# Patient Record
(demographics unavailable — no encounter records)

---

## 2024-12-31 NOTE — HISTORY OF PRESENT ILLNESS
[FreeTextEntry1] : This is a 67 year old male diagnosed with prostate cancer in June 2023 referred by Dr Ricardo Mcdowell and Dr. Eagle.   He presented with an elevated PSA of 7.7 ng/mL drawn on 4/24/23.   MRI of the prostate performed on 5/22/23 showed a 51.4 cc prostate. A 44 x 31 mm lesion predominantly right (crossing into left) throughout transverse plane midgland (extending from base to apex) peripheral zone lesion extending into the transition zone. PIRADS 5. Overlying gross extraprostatic extension. No seminal vesicle invasion. A 5 mm left pelvic sidewall lymph nodes, possibly reactive.   Repeat PSA on 5/31/23 was 6.7 ng/mL.   Prostate biopsy performed on 6/22/23 showed adenocarcinoma in 9 of 13 cores. Christian score of 3+4=7 in 9 cores. Maximum core involvement 95%.   He states he has nocturia 1x.    He was assessed to have prostate adenoca, at least stage IIIB, [id number], PSA of 7.7, Christian score of 7 (3+4), with 9 of 13 cores positive, max core involvement 95%. MRI prostate shows prostate enlargement and a broad zone of extracapsular extension of disease. Patient has high-risk high-volume disease. PSMA PET/CT scan did not show any pelvic ernesto disease or osseous metastases.  He completed RT to the prostate, SV, Nodes, total dose of 7020 cGy in 26 fx, on 11/29/23.   PSA drawn by Dr. Mcdowell on 12/8/23 was 0.18. PSA on 5/16/2024 was 0.03 ng/mL  Receives Lupron injections q3 months with Dr. Mcdowell.    7/08/2024 Patient presents for routine 8 month follow up. Feeling well. Denies urinary complaints. Denies any bowel issues.   01/09/2025 Patient presents for a 1 year 1 month follow up visit since completing radiation therapy to the prostate/sv/nodes from 10/24/23 to 11/29/23.  Patient denies dysuria, urinary frequency, urgency, hematuria, incontinence. Nocturia x  Patient continues to follow with Dr Ricardo Mcdowell and Dr. Eagle. He receives Lupron injections every 3 months with Dr. Mcdowell? Last PSA...awaiting records from Dr Ricardo Mcdowell and Dr. Eagle office.

## 2025-01-17 NOTE — HISTORY OF PRESENT ILLNESS
[FreeTextEntry1] : This is a 67 year old male diagnosed with prostate cancer in June 2023 referred by Dr Ricardo Mcdowell and Dr. Eagle.   He presented with an elevated PSA of 7.7 ng/mL drawn on 4/24/23.   MRI of the prostate performed on 5/22/23 showed a 51.4 cc prostate. A 44 x 31 mm lesion predominantly right (crossing into left) throughout transverse plane midgland (extending from base to apex) peripheral zone lesion extending into the transition zone. PIRADS 5. Overlying gross extraprostatic extension. No seminal vesicle invasion. A 5 mm left pelvic sidewall lymph nodes, possibly reactive.   Repeat PSA on 5/31/23 was 6.7 ng/mL.   Prostate biopsy performed on 6/22/23 showed adenocarcinoma in 9 of 13 cores. Christian score of 3+4=7 in 9 cores. Maximum core involvement 95%.   He states he has nocturia 1x.    He was assessed to have prostate adenoca, at least stage IIIB, [id number], PSA of 7.7, Christian score of 7 (3+4), with 9 of 13 cores positive, max core involvement 95%. MRI prostate shows prostate enlargement and a broad zone of extracapsular extension of disease. Patient has high-risk high-volume disease. PSMA PET/CT scan did not show any pelvic ernesto disease or osseous metastases.  He completed RT to the prostate, SV, Nodes, total dose of 7020 cGy in 26 fx, on 11/29/23.   PSA  on 12/8/23 was 0.18. PSA on 5/16/2024 was 0.03 ng/mL PSA on 11/7/24 was 0.015 ng/mL  He saw Dr. Eagle on 10/23/24 for a routine follow up.  01/17/2025 Patient presents for a 1 year 1 month follow up visit since completing radiation therapy. Patient denies dysuria, urinary frequency, urgency, hematuria, incontinence, nocturia. He denies any GI symptoms.  Only complaint is hot flashes.  Patient continues to follow with Dr Ricardo Mcdowell and Dr. Eagle. He continues to receive Lupron injections every 3 months with Dr. Mcdowell ( to receive a total of 18 months ADT, to complete in August 2025) . Last PSA was 0.015 on 11/17/24.

## 2025-01-17 NOTE — HISTORY OF PRESENT ILLNESS
[FreeTextEntry1] : This is a 67 year old male diagnosed with prostate cancer in June 2023 referred by Dr Ricardo Mcdowell and Dr. Eagle.   He presented with an elevated PSA of 7.7 ng/mL drawn on 4/24/23.   MRI of the prostate performed on 5/22/23 showed a 51.4 cc prostate. A 44 x 31 mm lesion predominantly right (crossing into left) throughout transverse plane midgland (extending from base to apex) peripheral zone lesion extending into the transition zone. PIRADS 5. Overlying gross extraprostatic extension. No seminal vesicle invasion. A 5 mm left pelvic sidewall lymph nodes, possibly reactive.   Repeat PSA on 5/31/23 was 6.7 ng/mL.   Prostate biopsy performed on 6/22/23 showed adenocarcinoma in 9 of 13 cores. Christian score of 3+4=7 in 9 cores. Maximum core involvement 95%.   He states he has nocturia 1x.    He was assessed to have prostate adenoca, at least stage IIIB, [id number], PSA of 7.7, Christian score of 7 (3+4), with 9 of 13 cores positive, max core involvement 95%. MRI prostate shows prostate enlargement and a broad zone of extracapsular extension of disease. Patient has high-risk high-volume disease. PSMA PET/CT scan did not show any pelvic ernesto disease or osseous metastases.  He completed RT to the prostate, SV, Nodes, total dose of 7020 cGy in 26 fx, on 11/29/23.   PSA  on 12/8/23 was 0.18. PSA on 5/16/2024 was 0.03 ng/mL PSA on 11/7/24 was 0.015 ng/mL  He saw Dr. Eagle on 10/23/24 for a routine follow up.  01/17/2025 Patient presents for a 1 year 1 month follow up visit since completing radiation therapy. Patient denies dysuria, urinary frequency, urgency, hematuria, incontinence, nocturia. He denies any GI symptoms.  Only complaint is hot flashes.  Patient continues to follow with Dr Ricardo Mcdowell and Dr. Eagle. He continues to receive Lupron injections every 3 months with Dr. Mcdowell ( to receive a total of 18 months ADT, to complete in August 2025) . Last PSA was 0.015 on 11/17/24.

## 2025-01-17 NOTE — HISTORY OF PRESENT ILLNESS
[FreeTextEntry1] : This is a 67 year old male diagnosed with prostate cancer in June 2023 referred by Dr Ricardo Mcdowell and Dr. Eagle.   He presented with an elevated PSA of 7.7 ng/mL drawn on 4/24/23.   MRI of the prostate performed on 5/22/23 showed a 51.4 cc prostate. A 44 x 31 mm lesion predominantly right (crossing into left) throughout transverse plane midgland (extending from base to apex) peripheral zone lesion extending into the transition zone. PIRADS 5. Overlying gross extraprostatic extension. No seminal vesicle invasion. A 5 mm left pelvic sidewall lymph nodes, possibly reactive.   Repeat PSA on 5/31/23 was 6.7 ng/mL.   Prostate biopsy performed on 6/22/23 showed adenocarcinoma in 9 of 13 cores. Christian score of 3+4=7 in 9 cores. Maximum core involvement 95%.   He states he has nocturia 1x.    He was assessed to have prostate adenoca, at least stage IIIB, [id number], PSA of 7.7, Christian score of 7 (3+4), with 9 of 13 cores positive, max core involvement 95%. MRI prostate shows prostate enlargement and a broad zone of extracapsular extension of disease. Patient has high-risk high-volume disease. PSMA PET/CT scan did not show any pelvic ernesto disease or osseous metastases.  He completed RT to the prostate, SV, Nodes, total dose of 7020 cGy in 26 fx, on 11/29/23.   PSA  on 12/8/23 was 0.18. PSA on 5/16/2024 was 0.03 ng/mL PSA on 11/7/24 was 0.015 ng/mL  He saw Dr. Eagle on 10/23/24 for a routine follow up.  01/17/2025 Patient presents for a 1 year 1 month follow up visit since completing radiation therapy. Patient denies dysuria, urinary frequency, urgency, hematuria, incontinence, nocturia. He denies any GI symptoms.  Only complaint is hot flashes.  Patient continues to follow with Dr Ricardo Mcdowell and Dr. Eagle. He continues to receive Lupron injections every 3 months with Dr. Mcdowell ( to receive a total of 18 months ADT, to complete in August 2025) . Last PSA was 0.015 on 11/17/24.

## 2025-01-17 NOTE — HISTORY OF PRESENT ILLNESS
[FreeTextEntry1] : This is a 67 year old male diagnosed with prostate cancer in June 2023 referred by Dr Ricardo Mcdowell and Dr. Eagle.   He presented with an elevated PSA of 7.7 ng/mL drawn on 4/24/23.   MRI of the prostate performed on 5/22/23 showed a 51.4 cc prostate. A 44 x 31 mm lesion predominantly right (crossing into left) throughout transverse plane midgland (extending from base to apex) peripheral zone lesion extending into the transition zone. PIRADS 5. Overlying gross extraprostatic extension. No seminal vesicle invasion. A 5 mm left pelvic sidewall lymph nodes, possibly reactive.   Repeat PSA on 5/31/23 was 6.7 ng/mL.   Prostate biopsy performed on 6/22/23 showed adenocarcinoma in 9 of 13 cores. Christian score of 3+4=7 in 9 cores. Maximum core involvement 95%.   He states he has nocturia 1x.    He was assessed to have prostate adenoca, at least stage IIIB, [id number], PSA of 7.7, Christian score of 7 (3+4), with 9 of 13 cores positive, max core involvement 95%. MRI prostate shows prostate enlargement and a broad zone of extracapsular extension of disease. Patient has high-risk high-volume disease. PSMA PET/CT scan did not show any pelvic ernesto disease or osseous metastases.  He completed RT to the prostate, SV, Nodes, total dose of 7020 cGy in 26 fx, on 11/29/23.   PSA  on 12/8/23 was 0.18. PSA on 5/16/2024 was 0.03 ng/mL PSA on 11/7/24 was 0.015 ng/mL  He saw Dr. Eagle on 10/23/24 for a routine follow up.  01/17/2025 Patient presents for a 1 year 1 month follow up visit since completing radiation therapy. Patient denies dysuria, urinary frequency, urgency, hematuria, incontinence, nocturia. He denies any GI symptoms.  Only complaint is hot flashes.  Patient continues to follow with Dr Ricardo Mcdowell and Dr. Eagle. He continues to receive Lupron injections every 3 months with Dr. Mcdowell ( to receive a total of 18 months ADT, to complete in August 2025) . Last PSA was 0.015 on 11/17/24.

## 2025-07-09 NOTE — HISTORY OF PRESENT ILLNESS
[FreeTextEntry1] : This is a 67 year old male diagnosed with prostate cancer in June 2023 referred by Dr Ricardo Mcdowell and Dr. Eagle.   He presented with an elevated PSA of 7.7 ng/mL drawn on 4/24/23.   MRI of the prostate performed on 5/22/23 showed a 51.4 cc prostate. A 44 x 31 mm lesion predominantly right (crossing into left) throughout transverse plane midgland (extending from base to apex) peripheral zone lesion extending into the transition zone. PIRADS 5. Overlying gross extraprostatic extension. No seminal vesicle invasion. A 5 mm left pelvic sidewall lymph nodes, possibly reactive.   Repeat PSA on 5/31/23 was 6.7 ng/mL.   Prostate biopsy performed on 6/22/23 showed adenocarcinoma in 9 of 13 cores. Christian score of 3+4=7 in 9 cores. Maximum core involvement 95%.   He states he has nocturia 1x.    He was assessed to have prostate adenoca, at least stage IIIB, [id number], PSA of 7.7, Christian score of 7 (3+4), with 9 of 13 cores positive, max core involvement 95%. MRI prostate shows prostate enlargement and a broad zone of extracapsular extension of disease. Patient has high-risk high-volume disease. PSMA PET/CT scan did not show any pelvic ernesto disease or osseous metastases.  He completed RT to the prostate, SV, Nodes, total dose of 7020 cGy in 26 fx, on 11/29/23.   PSA  on 12/8/23 was 0.18. PSA on 5/16/2024 was 0.03 ng/mL PSA on 11/7/24 was 0.015 ng/mL PSA on 5/7/25 was <0.014 ng/mL  He saw Dr. Eagle on 10/23/24 for a routine follow up.  01/17/2025 Patient presents for a 1 year 1 month follow up visit since completing radiation therapy. Patient denies dysuria, urinary frequency, urgency, hematuria, incontinence, nocturia. He denies any GI symptoms.  Only complaint is hot flashes.  Patient continues to follow with Dr Ricardo Mcdowell and Dr. Eagle. He continues to receive Lupron injections every 3 months with Dr. Mcdowell ( to receive a total of 18 months ADT, to complete in August 2025) . Last PSA was 0.015 on 11/17/24.   He received his last Lupron injection on 5/7/25. (First Lupron injection was 8/8/23)  He last saw Dr. Mcdowell on 5/12/25 for a follow up of his prostate cancer and will see him again in 3 months in August.  07/17/2025 Patient presents for a 1 year 7 month follow up visit since completing radiation therapy. He denies any gastrointestinal or genitourinary symptoms and reports that his appetite, energy level and weight have returned to baseline. He completed his 2-year course of ADT in May under the care of Dr. Mcdowell. He is following with Dr. Eagle and Dr. Mcdowell. PSA drawn today.

## 2025-07-09 NOTE — HISTORY OF PRESENT ILLNESS
[FreeTextEntry1] : This is a 67 year old male diagnosed with prostate cancer in June 2023 referred by Dr Ricardo Mcdowell and Dr. Eagle.   He presented with an elevated PSA of 7.7 ng/mL drawn on 4/24/23.   MRI of the prostate performed on 5/22/23 showed a 51.4 cc prostate. A 44 x 31 mm lesion predominantly right (crossing into left) throughout transverse plane midgland (extending from base to apex) peripheral zone lesion extending into the transition zone. PIRADS 5. Overlying gross extraprostatic extension. No seminal vesicle invasion. A 5 mm left pelvic sidewall lymph nodes, possibly reactive.   Repeat PSA on 5/31/23 was 6.7 ng/mL.   Prostate biopsy performed on 6/22/23 showed adenocarcinoma in 9 of 13 cores. Christian score of 3+4=7 in 9 cores. Maximum core involvement 95%.   He states he has nocturia 1x.    He was assessed to have prostate adenoca, at least stage IIIB, [id number], PSA of 7.7, Christian score of 7 (3+4), with 9 of 13 cores positive, max core involvement 95%. MRI prostate shows prostate enlargement and a broad zone of extracapsular extension of disease. Patient has high-risk high-volume disease. PSMA PET/CT scan did not show any pelvic ernesto disease or osseous metastases.  He completed RT to the prostate, SV, Nodes, total dose of 7020 cGy in 26 fx, on 11/29/23.   PSA  on 12/8/23 was 0.18. PSA on 5/16/2024 was 0.03 ng/mL PSA on 11/7/24 was 0.015 ng/mL PSA on 5/7/25 was <0.014 ng/mL  He saw Dr. Eagle on 10/23/24 for a routine follow up.  01/17/2025 Patient presents for a 1 year 1 month follow up visit since completing radiation therapy. Patient denies dysuria, urinary frequency, urgency, hematuria, incontinence, nocturia. He denies any GI symptoms.  Only complaint is hot flashes.  Patient continues to follow with Dr Ricardo Mcdowell and Dr. Eagle. He continues to receive Lupron injections every 3 months with Dr. Mcdowell ( to receive a total of 18 months ADT, to complete in August 2025) . Last PSA was 0.015 on 11/17/24.   He received his last Lupron injection on 5/7/25. (First Lupron injection was 8/8/23)  He last saw Dr. Mcdowell on 5/12/25 for a follow up of his prostate cancer and will see him again in 3 months in August.  07/17/2025 Patient presents for a 1 year 7 month follow up visit since completing radiation therapy. He denies any gastrointestinal or genitourinary symptoms and reports that his appetite, energy level and weight have returned to baseline. He completed his 2-year course of ADT in May under the care of Dr. Mcdowell. He is following with Dr. Eagle and Dr. Mcdowell. PSA drawn today.

## 2025-07-17 NOTE — DISEASE MANAGEMENT
[TTNM] : 3b [NTNM] : 0 [MTNM] : 0 [3] : T3 [b] : b [0] : M0 [0-10] : 0 -10 ng/mL [Biopsy] : Patient had a biopsy on [7(3+4)] : Template Biopsy [place] Score: 7(3+4) [] : Patient had a Prostate MRI [5] : 5 [BiopsyDate] : 06/23 [MeasuredProstateVolume] : 51 [Radiation Therapy] : Radiation Therapy

## 2025-07-17 NOTE — DISEASE MANAGEMENT
[TTNM] : 3b [NTNM] : 0 [MTNM] : 0 [3] : T3 [b] : b [0] : M0 [0-10] : 0 -10 ng/mL [Biopsy] : Patient had a biopsy on [7(3+4)] : Template Biopsy [place] Score: 7(3+4) [] : Patient had a Prostate MRI [5] : 5 [BiopsyDate] : 06/23 [MeasuredProstateVolume] : 51 [Radiation Therapy] : Radiation Therapy

## 2025-07-17 NOTE — HISTORY OF PRESENT ILLNESS
[FreeTextEntry1] : This is a 67 year old male diagnosed with prostate cancer in June 2023 referred by Dr Ricardo Mcdowell and Dr. Eagle.   He presented with an elevated PSA of 7.7 ng/mL drawn on 4/24/23.   MRI of the prostate performed on 5/22/23 showed a 51.4 cc prostate. A 44 x 31 mm lesion predominantly right (crossing into left) throughout transverse plane midgland (extending from base to apex) peripheral zone lesion extending into the transition zone. PIRADS 5. Overlying gross extraprostatic extension. No seminal vesicle invasion. A 5 mm left pelvic sidewall lymph nodes, possibly reactive.   Repeat PSA on 5/31/23 was 6.7 ng/mL.   Prostate biopsy performed on 6/22/23 showed adenocarcinoma in 9 of 13 cores. Christian score of 3+4=7 in 9 cores. Maximum core involvement 95%.   He states he has nocturia 1x.    He was assessed to have prostate adenoca, at least stage IIIB, [id number], PSA of 7.7, Christian score of 7 (3+4), with 9 of 13 cores positive, max core involvement 95%. MRI prostate shows prostate enlargement and a broad zone of extracapsular extension of disease. Patient has high-risk high-volume disease. PSMA PET/CT scan did not show any pelvic ernesto disease or osseous metastases.  He completed RT to the prostate, SV, Nodes, total dose of 7020 cGy in 26 fx, on 11/29/23.   PSA  on 12/8/23 was 0.18. PSA on 5/16/2024 was 0.03 ng/mL PSA on 11/7/24 was 0.015 ng/mL PSA on 5/7/25 was <0.014 ng/mL  He saw Dr. Eagle on 10/23/24 for a routine follow up.  01/17/2025 Patient presents for a 1 year 1 month follow up visit since completing radiation therapy. Patient denies dysuria, urinary frequency, urgency, hematuria, incontinence, nocturia. He denies any GI symptoms.  Only complaint is hot flashes.  Patient continues to follow with Dr Ricardo Mcdowell and Dr. Eagle. He continues to receive Lupron injections every 3 months with Dr. Mcdowell ( to receive a total of 18 months ADT, to complete in August 2025) . Last PSA was 0.015 on 11/17/24.   He received a Lupron injection on 5/7/25. (First Lupron injection was 8/8/23)  He last saw Dr. Mcdowell on 5/12/25 for a follow up of his prostate cancer and will see him again in 3 months in August.  07/17/2025 Patient presents for a 1 year 7 month follow up visit since completing radiation therapy. He denies any gastrointestinal or genitourinary symptoms and reports that his appetite, energy level and weight have returned to baseline. He experiences intermittent hot flashes overnight and needs a fan or AC on to get through the night. He will be getting his next (and final) Lupron shot and a PSA drawn when he sees Dr. Mcdowell in 2 weeks. He is following with Dr. Eagle and will see him in October. He had a colonoscopy 3 months ago (Dr. Bo Waggoner affiliated with [facility]) for routine surveillance of colon polyps and was reported normal.

## 2025-07-17 NOTE — HISTORY OF PRESENT ILLNESS
[FreeTextEntry1] : This is a 67 year old male diagnosed with prostate cancer in June 2023 referred by Dr Ricardo Mcdowell and Dr. Eagle.   He presented with an elevated PSA of 7.7 ng/mL drawn on 4/24/23.   MRI of the prostate performed on 5/22/23 showed a 51.4 cc prostate. A 44 x 31 mm lesion predominantly right (crossing into left) throughout transverse plane midgland (extending from base to apex) peripheral zone lesion extending into the transition zone. PIRADS 5. Overlying gross extraprostatic extension. No seminal vesicle invasion. A 5 mm left pelvic sidewall lymph nodes, possibly reactive.   Repeat PSA on 5/31/23 was 6.7 ng/mL.   Prostate biopsy performed on 6/22/23 showed adenocarcinoma in 9 of 13 cores. Christian score of 3+4=7 in 9 cores. Maximum core involvement 95%.   He states he has nocturia 1x.    He was assessed to have prostate adenoca, at least stage IIIB, [id number], PSA of 7.7, Christian score of 7 (3+4), with 9 of 13 cores positive, max core involvement 95%. MRI prostate shows prostate enlargement and a broad zone of extracapsular extension of disease. Patient has high-risk high-volume disease. PSMA PET/CT scan did not show any pelvic ernesto disease or osseous metastases.  He completed RT to the prostate, SV, Nodes, total dose of 7020 cGy in 26 fx, on 11/29/23.   PSA  on 12/8/23 was 0.18. PSA on 5/16/2024 was 0.03 ng/mL PSA on 11/7/24 was 0.015 ng/mL PSA on 5/7/25 was <0.014 ng/mL  He saw Dr. Eagle on 10/23/24 for a routine follow up.  01/17/2025 Patient presents for a 1 year 1 month follow up visit since completing radiation therapy. Patient denies dysuria, urinary frequency, urgency, hematuria, incontinence, nocturia. He denies any GI symptoms.  Only complaint is hot flashes.  Patient continues to follow with Dr Ricardo Mcdowell and Dr. Eagle. He continues to receive Lupron injections every 3 months with Dr. Mcdowell ( to receive a total of 18 months ADT, to complete in August 2025) . Last PSA was 0.015 on 11/17/24.   He received a Lupron injection on 5/7/25. (First Lupron injection was 8/8/23)  He last saw Dr. Mcdowell on 5/12/25 for a follow up of his prostate cancer and will see him again in 3 months in August.  07/17/2025 Patient presents for a 1 year 7 month follow up visit since completing radiation therapy. He denies any gastrointestinal or genitourinary symptoms and reports that his appetite, energy level and weight have returned to baseline. He experiences intermittent hot flashes overnight and needs a fan or AC on to get through the night. He will be getting his next (and final) Lupron shot and a PSA drawn when he sees Dr. Mcdowell in 2 weeks. He is following with Dr. Eagle and will see him in October. He had a colonoscopy 3 months ago (Dr. Bo Waggoner affiliated with [facility]) for routine surveillance of colon polyps and was reported normal.

## 2025-07-17 NOTE — REVIEW OF SYSTEMS
[Night Sweats] : night sweats [Negative] : Heme/Lymph [Anal Pain: Grade 0] : Anal Pain: Grade 0 [Constipation: Grade 0] : Constipation: Grade 0 [Diarrhea: Grade 0] : Diarrhea: Grade 0 [Nausea: Grade 0] : Nausea: Grade 0 [Rectal Pain: Grade 0] : Rectal Pain: Grade 0 [Vomiting: Grade 0] : Vomiting: Grade 0 [Hematuria: Grade 0] : Hematuria: Grade 0 [Urinary Incontinence: Grade 0] : Urinary Incontinence: Grade 0  [Urinary Retention: Grade 0] : Urinary Retention: Grade 0 [Urinary Tract Pain: Grade 0] : Urinary Tract Pain: Grade 0 [Urinary Urgency: Grade 0] : Urinary Urgency: Grade 0 [Urinary Frequency: Grade 0] : Urinary Frequency: Grade 0 [Fever] : no fever [Chills] : no chills [Fatigue] : no fatigue [Recent Change In Weight] : ~T no recent weight change